# Patient Record
Sex: MALE | Race: BLACK OR AFRICAN AMERICAN | URBAN - METROPOLITAN AREA
[De-identification: names, ages, dates, MRNs, and addresses within clinical notes are randomized per-mention and may not be internally consistent; named-entity substitution may affect disease eponyms.]

---

## 2020-06-22 ENCOUNTER — OFFICE VISIT (OUTPATIENT)
Dept: ORTHOPEDIC SURGERY | Age: 50
End: 2020-06-22
Payer: COMMERCIAL

## 2020-06-22 VITALS
SYSTOLIC BLOOD PRESSURE: 124 MMHG | HEIGHT: 71 IN | BODY MASS INDEX: 28.56 KG/M2 | HEART RATE: 73 BPM | WEIGHT: 204 LBS | DIASTOLIC BLOOD PRESSURE: 86 MMHG

## 2020-06-22 PROCEDURE — 99203 OFFICE O/P NEW LOW 30 MIN: CPT | Performed by: ORTHOPAEDIC SURGERY

## 2020-06-22 RX ORDER — HYDROCHLOROTHIAZIDE 12.5 MG/1
TABLET ORAL
COMMUNITY
Start: 2020-06-11

## 2020-06-22 RX ORDER — IBUPROFEN 800 MG/1
TABLET ORAL
COMMUNITY
Start: 2020-05-12

## 2020-06-22 ASSESSMENT — ENCOUNTER SYMPTOMS
COUGH: 0
ABDOMINAL PAIN: 0
NAUSEA: 0
APNEA: 0
ABDOMINAL DISTENTION: 0
CONSTIPATION: 0
SHORTNESS OF BREATH: 0
CHEST TIGHTNESS: 0
DIARRHEA: 0
COLOR CHANGE: 0
VOMITING: 0

## 2020-06-22 NOTE — PROGRESS NOTES
[facility] [address] [facility]  [address] [place] [id number]  Dept: [phone]  Dept Fax: [phone]          Right Knee - Follow Up     Subjective:     Chief Complaint   Patient presents with    Knee Pain     Right Knee     HPI:     Jefry Barlow Trish who is a  and a referee for high school football, presents today for Right knee pain. The pain has been present for 10 months since August 2019. The patient recalls no specific injury. The patient has tried a brace, rest, heat and ice with no improvement. The pain is now described as Achy. There is pain on weight bearing. The knee has swelled but it is not swollen today. There is painful popping and clicking. The knee has not caught or locked up. The knee has given out and he feels it is weak at times. It is not stiff upon arising from sitting. It is not painful to go up and down stairs and sit for a prolonged time. The patient has not had a cortisone injection. The patient has not tried a lubrication injection. The patient has not tried physical therapy but he does home exercises where he squats, leg presses and does knee extensions. The patient has had surgery and it was right knee arthroscopy with ACL reconstruction and meniscal repair that was done in 2010. Therefore we are 10 years post op. The opposite knee is okay. Patient states that he is unable to take NSAID's due to his polycystic kidney disease. Patient wants to make sure that his knee is okay for football season because his first scrimmage is on 08/14/2020. ROS:   Review of Systems   Constitutional: Positive for activity change. Negative for appetite change. Respiratory: Negative for apnea, cough, chest tightness and shortness of breath. Cardiovascular: Negative for chest pain, palpitations and leg swelling.    Gastrointestinal: Negative for abdominal distention, abdominal pain, constipation, diarrhea, service: None     Active member of club or organization: None     Attends meetings of clubs or organizations: None     Relationship status: None    Intimate partner violence     Fear of current or ex partner: None     Emotionally abused: None     Physically abused: None     Forced sexual activity: None   Other Topics Concern    None   Social History Narrative    None     Family History:  No family history on file. Vitals:   /86   Pulse 73   Ht 5' 10.98\" (1.803 m)   Wt 204 lb (92.5 kg)   BMI 28.47 kg/m²  Body mass index is 28.47 kg/m². Physical Examination:     Orthopedics:    GENERAL: Alert and oriented X3 in no acute distress. SKIN: Intact without lesions or ulcerations. NEURO: Musculoskeletal and axillary nerves intact to sensory and motor testing. VASC: Capillary refill is less than 3 seconds. KNEE EXAM    LOCATION: Right Knee  GEN: Alert and oriented X 3, in no acute distress. GAIT: The patient's gait was observed while entering the exam room and was noted to be non antalgic. The extremity is in anatomic alignment. SKIN: Intact without rashes, lesions, or ulcerations. No obvious deformity or swelling. NEURO: The patient responds to light touch throughout right LE. Patellar and Achilles reflexes are 2/4. VASC: The right LE is neurovascularly intact with 2/4 DP and 2/4 PT pulses. Brisk capillary refill. ROM: 0/116 degrees vs. 0/124 degrees. There is minimal effusion. Crepitation noted. MUSC: good quad tone  LIGAMENT: Lachman's test is Negative with Good endpoint. Anterior drawer Negative. Posterior drawer Negative. There is No varus instability at 0 degrees and No varus instability at 30 degrees. There is No valgus instability at 0 degrees and No valgus instability at 30 degrees. SPECIAL: Miroslava test is negative with a clunk but there is no crepitation and no pain. PALP: There is no joint line pain. Assessment:     1.  Post-traumatic osteoarthritis of right knee      Procedures: Procedure: no  Radiology:   KNEE - ACL X-RAY    4 views of the right knee including AP, bilateral tunnel, and lateral in the upright position, and skyline views reveal anatomic alignment with no fracture or dislocation. Kellgren grade II changes of osteoarthritis (joint space narrowing, osteophyte, subchondral sclerosis, bony deformity/cyst) of the tricompartment(s). No osseous loose bodies. No bony erosion or periosteal reaction. No soft tissue masses. The ACL tunnels are noted in appropriate position. The hardware shows screw head was removed. Impression: Post-operative changes of ACL reconstruction with moderate osteoarthritis of the right knee. Plan:   Treatment : I reviewed the X-ray with the patient and I informed them that the knee has no evident fractures. We discussed the etiologies and natural histories of s/p Right knee arthroscopy with ACL reconstruction and meniscal repair. We discussed the various treatment alternatives including anti-inflammatory medications, physical therapy, injections, further imaging studies and as a last result surgery. During today's visit, I explained to the patient that his knee may be suffering from osteoarthritis that is causing his pain and with that, I informed him that the fastest thing that I can do for his knee pain is inject it with cortisone to help him get good pain relief. Now if he does not want to do that, I informed him that we can have him simply take NSAID's at a prescription dose to try and reduce the inflammation and pain that he has in the knee. I also told him that he should stay away from knee extension because it is too much pressure on his patella and that may also aggravate the knee. But he may continue doing the leg press and squats no lower than 90 degrees.  So I asked the patient what he would like to do and he stated that he will probably continue doing the exercises since he did not tear his ACL or meniscus and because he cannot take Marin Iverson DO, on 6/27/2020 at 7:07 PM